# Patient Record
Sex: MALE | Race: OTHER | HISPANIC OR LATINO | URBAN - METROPOLITAN AREA
[De-identification: names, ages, dates, MRNs, and addresses within clinical notes are randomized per-mention and may not be internally consistent; named-entity substitution may affect disease eponyms.]

---

## 2022-08-21 ENCOUNTER — EMERGENCY (EMERGENCY)
Facility: HOSPITAL | Age: 47
LOS: 0 days | Discharge: HOME | End: 2022-08-21
Attending: EMERGENCY MEDICINE | Admitting: EMERGENCY MEDICINE

## 2022-08-21 VITALS
RESPIRATION RATE: 18 BRPM | TEMPERATURE: 99 F | SYSTOLIC BLOOD PRESSURE: 143 MMHG | OXYGEN SATURATION: 96 % | HEART RATE: 105 BPM | DIASTOLIC BLOOD PRESSURE: 75 MMHG | WEIGHT: 199.96 LBS

## 2022-08-21 VITALS
DIASTOLIC BLOOD PRESSURE: 78 MMHG | SYSTOLIC BLOOD PRESSURE: 150 MMHG | OXYGEN SATURATION: 97 % | HEART RATE: 89 BPM | RESPIRATION RATE: 18 BRPM

## 2022-08-21 DIAGNOSIS — S01.01XA LACERATION WITHOUT FOREIGN BODY OF SCALP, INITIAL ENCOUNTER: ICD-10-CM
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
## 2022-08-21 DIAGNOSIS — F17.200 NICOTINE DEPENDENCE, UNSPECIFIED, UNCOMPLICATED: ICD-10-CM
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
## 2022-08-21 PROCEDURE — 12002 RPR S/N/AX/GEN/TRNK2.6-7.5CM: CPT

## 2022-08-21 PROCEDURE — 99283 EMERGENCY DEPT VISIT LOW MDM: CPT | Mod: 25

## 2022-08-21 NOTE — ED PROVIDER NOTE - PATIENT PORTAL LINK FT
You can access the FollowMyHealth Patient Portal offered by [facility] by registering at the following website: [email or web address]. By joining [facility]’s FollowMyHealth portal, you will also be able to view your health information using other applications (apps) compatible with our system.
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
## 2022-08-21 NOTE — ED PROVIDER NOTE - NS ED ATTENDING STATEMENT MOD
This was a shared visit with the CATRACHO. I reviewed and verified the documentation and independently performed the documented:
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
## 2022-08-21 NOTE — ED PROVIDER NOTE - NSFOLLOWUPINSTRUCTIONS_ED_ALL_ED_FT
Closed Head Injury    Closed head injury in an injury to your head that may or may not involve a traumatic brain injury (TBI). Symptoms of TBI can be short or long lasting and include headache, dizziness, interference with memory or speech, fatigue, confusion, changes in sleep, mood changes, nausea, depression/anxiety, and dulling of senses. Make sure to obtain proper rest which includes getting plenty of sleep, avoiding excessive visual stimulation, and avoiding activities that may cause physical or mental stress. Avoid any situation where there is potential for another head injury including sports.    SEEK MEDICAL CARE IF YOU HAVE THE FOLLOWING SYMPTOMS: unusual drowsiness, vomiting, severe dizziness, seizures, lightheadedness, muscular weakness, different pupil sizes, visual changes, or clear or bloody discharge from your ears or nose.      A laceration is a cut that may go through all the layers of the skin. The cut may also go into the tissue that is right under the skin. Some cuts heal on their own. Others need to be closed by stitches, staples, skin adhesive strips, or skin glue. Taking care of your cuts lowers the risk of infection, helps the injury heal better, and prevents scarring.    Wash your hands with soap and water before touching your  wound or changing your  bandage (dressing). If soap and water are not available, use hand .    Keep the wound clean and dry.    If your were given a bandage, change it at least once a day or as told by the doctor. You should also change it if it gets dirty or wet.    If the doctor used stitches or staples:     Clean the wound once a day, or as told by your doctor.  Wash the wound with soap and water.  Rinse the wound with water to remove all soap.  Pat the wound dry with a clean towel. Do not rub the wound.  After you clean the wound, put a thin layer of antibiotic ointment on it as told by your doctor.  Keep the wound completely dry for the first 24 hours, or as told by the doctor. Your may take a shower or a bath after that. Do not soak the wound in water.  Have the stitches or staples removed as told by the doctor.  If the doctor used skin adhesive strips:     Do not let the skin adhesive strips get wet. Your may shower or bathe, but be careful to keep the wound dry.  If the wound gets wet, pat it dry with a clean towel. Do not rub the wound.  Skin adhesive strips fall off on their own. You can trim the strips as the wound heals. Do not remove any strips that are still stuck to the wound. They will fall off after a while.        Do not scratch or pick at the wound.. Watch for:  Redness, swelling, or pain.  Fluid, blood, or pus.  Return to ER if:  You notice something coming out of the wound, such as wood, glass, fluid, blood, or pus.  You have any of these at the site of the wound:  More redness.  More swelling.  More pain.  A bad smell.  You need to change the bandage often because fluid, blood, or pus is coming from the wound.    Y  Summary  A laceration is a cut that may go through all layers of the skin. The cut may also go into the tissue that is right under the skin.  Some cuts heal on their own. Others need to be closed with stitches, staples, skin adhesive strips, or skin glue.  Caring for a cut lowers the risk of infection, helps the cut heal better, and prevents scarring.  This information is not intended to replace advice given to you by your health care provider. Make sure you discuss any questions you have with your health care provider.      Return to the emergency room in 10 days for suture removal, You may also follow-up with your primary care doctor, call ahead of time to inquire if sutures can be removed in the office.
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
## 2022-08-21 NOTE — ED PROVIDER NOTE - PHYSICAL EXAMINATION
CONSTITUTIONAL: Well-appearing; well-nourished; in no apparent distress.   EYES: PERRL; EOM intact.   ENT: normal nose; no rhinorrhea; normal pharynx with no tonsillar hypertrophy.   NECK: Supple; non-tender; no cervical lymphadenopathy.   CARDIOVASCULAR: Normal S1, S2; no murmurs, rubs, or gallops.   RESPIRATORY: Normal chest excursion with respiration; breath sounds clear and equal bilaterally; no wheezes, rhonchi, or rales.  GI/: Normal bowel sounds; non-distended; non-tender; no palpable organomegaly.   MS: No evidence of trauma or deformity.  Normal ROM in all four extremities; non-tender to palpation; distal pulses are normal.   SKIN: + superficial 3cm laceration to rt side of scalp. Small superficial abrasion noted to occipital region.   NEURO/PSYCH: A & O x 4; grossly unremarkable. mood and manner are appropriate. Grooming and personal hygiene are appropriate. no focal deficits. No facial droop. No tongue deviation. Cerebellar intact. normal gait. Sensation intact

## 2022-08-21 NOTE — ED PROVIDER NOTE - NS ED ROS FT
Constitutional: no fever, chills, no recent weight loss, change in appetite or malaise  Eyes: no redness/discharge/pain/vision changes  ENT: no rhinorrhea/ear pain/sore throat  Cardiac: No chest pain, SOB or edema.  Respiratory: No cough or respiratory distress  GI: No nausea, vomiting, diarrhea or abdominal pain.  : No dysuria, frequency, urgency or hematuria  MS: no pain to back or extremities, no loss of ROM, no weakness  Neuro: No headache or weakness. No LOC.  Skin: + laceration to scalp  Endocrine: No history of thyroid disease or diabetes.  Except as documented in the HPI, all other systems are negative.
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
## 2022-08-21 NOTE — ED PROVIDER NOTE - HIV OFFER
Telephone Encounter by Aura Padilla RN at 04/04/17 04:24 PM     Author:  Aura Padilla RN Service:  (none) Author Type:  Registered Nurse     Filed:  04/04/17 04:25 PM Encounter Date:  4/4/2017 Status:  Signed     :  Aura Padilla RN (Registered Nurse)            Appointment info given to RN at [facility].  Appointment tomorrow with Dr. Vaz at 830am.  Booked in IDX[[email or web address]]      Revision History        User Key Date/Time User Provider Type Action    > AB1.1 04/04/17 04:25 PM Aura Padilla, RN Registered Nurse Sign    M - Manual             Opt out